# Patient Record
Sex: FEMALE | Race: WHITE
[De-identification: names, ages, dates, MRNs, and addresses within clinical notes are randomized per-mention and may not be internally consistent; named-entity substitution may affect disease eponyms.]

---

## 2018-03-21 ENCOUNTER — HOSPITAL ENCOUNTER (EMERGENCY)
Dept: HOSPITAL 72 - EMR | Age: 73
Discharge: HOME | End: 2018-03-21
Payer: MEDICARE

## 2018-03-21 VITALS — WEIGHT: 164 LBS | BODY MASS INDEX: 29.06 KG/M2 | HEIGHT: 63 IN

## 2018-03-21 VITALS — DIASTOLIC BLOOD PRESSURE: 60 MMHG | SYSTOLIC BLOOD PRESSURE: 143 MMHG

## 2018-03-21 VITALS — SYSTOLIC BLOOD PRESSURE: 143 MMHG | DIASTOLIC BLOOD PRESSURE: 60 MMHG

## 2018-03-21 DIAGNOSIS — N17.9: ICD-10-CM

## 2018-03-21 DIAGNOSIS — E27.8: ICD-10-CM

## 2018-03-21 DIAGNOSIS — N13.2: Primary | ICD-10-CM

## 2018-03-21 DIAGNOSIS — N39.0: ICD-10-CM

## 2018-03-21 DIAGNOSIS — I10: ICD-10-CM

## 2018-03-21 LAB
ADD MANUAL DIFF: NO
ALBUMIN SERPL-MCNC: 3.8 G/DL (ref 3.4–5)
ALBUMIN/GLOB SERPL: 1.3 {RATIO} (ref 1–2.7)
ALP SERPL-CCNC: 97 U/L (ref 46–116)
ALT SERPL-CCNC: 34 U/L (ref 12–78)
ANION GAP SERPL CALC-SCNC: 8 MMOL/L (ref 5–15)
APPEARANCE UR: (no result)
APTT PPP: (no result) S
AST SERPL-CCNC: 27 U/L (ref 15–37)
BILIRUB SERPL-MCNC: 0.5 MG/DL (ref 0.2–1)
BUN SERPL-MCNC: 40 MG/DL (ref 7–18)
CALCIUM SERPL-MCNC: 9.6 MG/DL (ref 8.5–10.1)
CHLORIDE SERPL-SCNC: 103 MMOL/L (ref 98–107)
CO2 SERPL-SCNC: 26 MMOL/L (ref 21–32)
CREAT SERPL-MCNC: 2 MG/DL (ref 0.55–1.3)
ERYTHROCYTE [DISTWIDTH] IN BLOOD BY AUTOMATED COUNT: 12.2 % (ref 11.6–14.8)
GLOBULIN SER-MCNC: 2.9 G/DL
GLUCOSE UR STRIP-MCNC: NEGATIVE MG/DL
HCT VFR BLD CALC: 42 % (ref 37–47)
HGB BLD-MCNC: 14.7 G/DL (ref 12–16)
KETONES UR QL STRIP: (no result)
LEUKOCYTE ESTERASE UR QL STRIP: (no result)
MCV RBC AUTO: 91 FL (ref 80–99)
NITRITE UR QL STRIP: NEGATIVE
PH UR STRIP: 7 [PH] (ref 4.5–8)
PLATELET # BLD: 115 K/UL (ref 150–450)
POTASSIUM SERPL-SCNC: 4.5 MMOL/L (ref 3.5–5.1)
PROT UR QL STRIP: (no result)
RBC # BLD AUTO: 4.61 M/UL (ref 4.2–5.4)
SODIUM SERPL-SCNC: 137 MMOL/L (ref 136–145)
SP GR UR STRIP: 1.01 (ref 1–1.03)
UROBILINOGEN UR-MCNC: 1 MG/DL (ref 0–1)
WBC # BLD AUTO: 10.4 K/UL (ref 4.8–10.8)

## 2018-03-21 PROCEDURE — 83690 ASSAY OF LIPASE: CPT

## 2018-03-21 PROCEDURE — 36415 COLL VENOUS BLD VENIPUNCTURE: CPT

## 2018-03-21 PROCEDURE — 80053 COMPREHEN METABOLIC PANEL: CPT

## 2018-03-21 PROCEDURE — 85025 COMPLETE CBC W/AUTO DIFF WBC: CPT

## 2018-03-21 PROCEDURE — 74176 CT ABD & PELVIS W/O CONTRAST: CPT

## 2018-03-21 PROCEDURE — 81025 URINE PREGNANCY TEST: CPT

## 2018-03-21 PROCEDURE — 96374 THER/PROPH/DIAG INJ IV PUSH: CPT

## 2018-03-21 PROCEDURE — 99284 EMERGENCY DEPT VISIT MOD MDM: CPT

## 2018-03-21 PROCEDURE — 81003 URINALYSIS AUTO W/O SCOPE: CPT

## 2018-03-21 PROCEDURE — 96375 TX/PRO/DX INJ NEW DRUG ADDON: CPT

## 2018-03-21 NOTE — EMERGENCY ROOM REPORT
History of Present Illness


General


Chief Complaint:  Pain


Source:  Patient





Present Illness


HPI


73 yo female patient presents to ER for kidney stones.


Reports CT done five days ago showing kidney stones; does not have copy of 

report; reports 2mm stone found at that time.


Patient complains of worsening of pain since that time.


Reports taking medication at home for pain without relief of symptoms. 


Complains of flank pain and nausea, denies vomiting.


Reports hx of kidney stones twice before int he past. Reports they were "broken 

up" in hospital.


Denies dysuria, hematuria.


Denies fever, chest pain, SOB.


Allergies:  


Coded Allergies:  


     No Known Allergies (Unverified , 3/21/18)





Patient History


Past Medical History:  see triage record


Reviewed Nursing Documentation:  PMH: Agreed; PSxH: Agreed





Nursing Documentation-PMH


Past Medical History:  No History, Except For


Hx Hypertension:  Yes





Review of Systems


All Other Systems:  negative except mentioned in HPI





Physical Exam





Vital Signs








  Date Time  Temp Pulse Resp B/P (MAP) Pulse Ox O2 Delivery O2 Flow Rate FiO2


 


3/21/18 16:41 98.0 65 18 170/65 97 Room Air  





 98.1       








Sp02 EP Interpretation:  reviewed, normal


General Appearance:  well appearing, alert, GCS 15, non-toxic, mild distress


Head:  normocephalic, atraumatic


Eyes:  bilateral eye normal inspection, bilateral eye PERRL


ENT:  hearing grossly normal, normal pharynx, no angioedema, normal voice, 

uvula midline, moist mucus membranes


Neck:  full range of motion


Respiratory:  lungs clear, normal breath sounds, no rhonchi, no respiratory 

distress, no accessory muscle use, no wheezing, speaking full sentences


Cardiovascular #1:  regular rate, rhythm, no edema


Gastrointestinal:  soft, no mass, non-distended, no guarding, no rebound, 

tenderness


Genitourinary:  no CVA tenderness


Musculoskeletal:  back normal, digits/nails normal, gait/station normal, normal 

range of motion, non-tender


Neurologic:  alert, oriented x3, responsive, motor strength/tone normal, 

sensory intact


Psychiatric:  mood/affect normal


Skin:  no rash


Lymphatic:  no adenopathy





Medical Decision Making


PA Attestation


Dr. Herron is my supervising physician with whom patient management has been 

discussed with.


Diagnostic Impression:  


 Primary Impression:  


 Nephrolithiasis


 Additional Impressions:  


 Acute kidney injury


 Urinary tract infection


ER Course


Pt. presents to the ED c/o kidney stones.





Ddx considered but are not limited to UTI, cholelithiasis, cholecystitis, 

pancreatitis, appendicitis, diverticulitis.


Begin abdominal pain workup. Provided patient with pain medication.





Vital signs: are WNL, pt. is afebrile





CURES reports shows no prescriptions filled.





ORDERS:  CBC, CMP, Lipase, UA, Urine pregnancy,  CT abdomen pelvis, Zofran, and 

pain medication.





ER COURSE:


Labs unremarkable, no elevation in WBC, LFTs, or lipase.


BUN and creatinine elevated, consistent with acute kidney injury caused by 

nephrolithiasis.


Urine pregnancy negative


UA equivocal for UTI. Patient reports currently being treated for UTI by 

primary care provider, has 2 days left of ciprofloxacin. Informed patient to 

continue taking medication provided by PCP, will provide single dose of Cipro 

in ER. Will not discharge home with abx.


Patient reports relief of pain symptoms with medication.





0610PM patient resting comfortably, sleeping, in no acute distress.





CT results shows 4mm calculus in kidney. Informed patient stone is non-

obstructive and she is likely to pass it on her own, more likely to cause 

obstruction if >5mm.


Perinephric stranding consistent with UTI. No appendicitis, diverticulitis, or 

SBO.


Discuss results with patient. Copy of results provided to patient





IV fluids provided to patient. Instructed patient to drink fluids to aid in 

passage of kidney stones.





Consult with Dr. Herron, reports agreement with diagnosis, treatment, and 

plan. Patient is stable to discharge home.





Patient reports she is ready for discharge home. Will be driven home by 

. Requesting Norco pain medication. Will provide 5 pills to patient 

until able to followup with primary care provider and discuss referral to  

specialist. Dr. Herron agrees.





DISCHARGE: 


Rx provided for Norco. Take Tylenol for pain following completion of 

medication. Followup with primary care provider for further treatment for pain 

symptoms. Declines Tylenol rx.





At this time pt. is stable for d/c to home. Patient resting comfortably, in no 

acute distress, nontoxic appearing, talking without difficulty, smiling and 

laughing.


Rx provided to patient. Patient to take medications as instructed


Will provide with patient care instructions and any necessary prescriptions.


Care plan and follow-up instructions provided.  Patient instructed to follow-up 

with primary care provider in 3 - 5 days.


Patient questions asked and answered.


Patient reports understanding and agreement to treatment plan.


ER precautions given. Patient instructed to return to ER immediately for any 

new or worsening of symptoms including but not limited to increasing SOB, 

persistent fever, worsening of pain symptoms, intractable vomiting, blood in 

stool, urine, and/or emesis.





Labs








Test


  3/21/18


17:20


 


White Blood Count


  10.4 K/UL


(4.8-10.8)


 


Red Blood Count


  4.61 M/UL


(4.20-5.40)


 


Hemoglobin


  14.7 G/DL


(12.0-16.0)


 


Hematocrit


  42.0 %


(37.0-47.0)


 


Mean Corpuscular Volume 91 FL (80-99) 


 


Mean Corpuscular Hemoglobin


  31.8 PG


(27.0-31.0)


 


Mean Corpuscular Hemoglobin


Concent 34.9 G/DL


(32.0-36.0)


 


Red Cell Distribution Width


  12.2 %


(11.6-14.8)


 


Platelet Count


  115 K/UL


(150-450)


 


Mean Platelet Volume


  9.5 FL


(6.5-10.1)


 


Neutrophils (%) (Auto)  % (45.0-75.0) 


 


Lymphocytes (%) (Auto)  % (20.0-45.0) 


 


Monocytes (%) (Auto)  % (1.0-10.0) 


 


Eosinophils (%) (Auto)  % (0.0-3.0) 


 


Basophils (%) (Auto)  % (0.0-2.0) 


 


Urine Color Pale yellow 


 


Urine Appearance


  Slightly


cloudy


 


Urine pH 7 (4.5-8.0) 


 


Urine Specific Gravity


  1.010


(1.005-1.035)


 


Urine Protein 2+ (NEGATIVE) 


 


Urine Glucose (UA)


  Negative


(NEGATIVE)


 


Urine Ketones 1+ (NEGATIVE) 


 


Urine Occult Blood 5+ (NEGATIVE) 


 


Urine Nitrite


  Negative


(NEGATIVE)


 


Urine Bilirubin


  Negative


(NEGATIVE)


 


Urine Urobilinogen


  1 MG/DL


(0.0-1.0)


 


Urine Leukocyte Esterase 2+ (NEGATIVE) 


 


Urine RBC


  20-30 /HPF (0


- 2)


 


Urine WBC


  2-4 /HPF (0 -


2)


 


Urine Squamous Epithelial


Cells Few /LPF


(NONE/OCC)


 


Urine Bacteria


  Few /HPF


(NONE)


 


Urine HCG, Qualitative


  Negative


(NEGATIVE)


 


Sodium Level


  137 MMOL/L


(136-145)


 


Potassium Level


  4.5 MMOL/L


(3.5-5.1)


 


Chloride Level


  103 MMOL/L


()


 


Carbon Dioxide Level


  26 MMOL/L


(21-32)


 


Anion Gap


  8 mmol/L


(5-15)


 


Blood Urea Nitrogen


  40 mg/dL


(7-18)


 


Creatinine


  2.0 MG/DL


(0.55-1.30)


 


Estimat Glomerular Filtration


Rate 24.6 mL/min


(>60)


 


Glucose Level


  121 MG/DL


()


 


Calcium Level


  9.6 MG/DL


(8.5-10.1)


 


Total Bilirubin


  0.5 MG/DL


(0.2-1.0)


 


Aspartate Amino Transf


(AST/SGOT) 27 U/L (15-37) 


 


 


Alanine Aminotransferase


(ALT/SGPT) 34 U/L (12-78) 


 


 


Alkaline Phosphatase


  97 U/L


()


 


Total Protein


  6.7 G/DL


(6.4-8.2)


 


Albumin


  3.8 G/DL


(3.4-5.0)


 


Globulin 2.9 g/dL 


 


Albumin/Globulin Ratio 1.3 (1.0-2.7) 


 


Lipase


  161 U/L


()








CT/MRI/US Diagnostic Results


CT/MRI/US Diagnostic Results :  


   Imaging Test Ordered:  CT Abdomen pelvis


   Impression


Left proximal ureter 4 mm calculus with associated hydronephrosis.


Left perinephric stranding, cannot exclude infection/inflammation.


Right nephrolithiasis.


Bilateral adrenal thickening, followup per final report.


No appendicitis, SBO, or diverticulitis. Moderate colonic stool.





Last Vital Signs








  Date Time  Temp Pulse Resp B/P (MAP) Pulse Ox O2 Delivery O2 Flow Rate FiO2


 


3/21/18 16:41 98.0 65 18 170/65 97 Room Air  





 98.1       








Disposition:  HOME, SELF-CARE


Condition:  Stable


Scripts


Hydrocodone Bit/Acetaminophen 5-325* (NORCO 5-325*) 1 Each Tablet


1 TAB ORAL Q6H PRN for For Pain, #5 TAB 0 Refills


   Prov: Augustus York         3/21/18


Patient Instructions:  Acute Kidney Injury, Kidney Stones, Easy-to-Read, 

Urinary Tract Infection, Easy-to-Read





Additional Instructions:  


Followup with primary care provider in 3 -5 days.


Continue to take Cipro for UTI.


Take medications as directed. 


Patient questions asked and answered.


ER precautions given, patient instructed to return to ER immediately for any 

new or worsening of symptoms.











Augustus York Mar 21, 2018 17:17

## 2018-03-22 NOTE — DIAGNOSTIC IMAGING REPORT
Indication: Abdominal pain

 

Technique: Spiral acquisitions obtained through the abdomen and pelvis. No oral

contrast utilized, per emergency room physician request No IV contrast utilized,  per

referring physician request.. Multiplanar reconstructions were generated. Total dose

length product 802.63 mGycm. CTDIvol(s) 16.38 mGy. Dose reduction achieved using

automated exposure control

 

 

Comparison: None

 

Findings: There is a 6 x 4 mm calculus in the proximal left ureter just beyond the

ureteropelvic junction. There is results are mild to moderate left hydronephrosis and

considerable perinephric fat stranding, as well as enlargement of the left kidney. No

intrarenal calculi are demonstrated. Lack of IV contrast limits assessment of the

renal parenchyma. There is a 1.5 cm cyst in the upper pole of the left kidney.

 

The right kidney is markedly atrophic. It demonstrates multiple cysts. It also

demonstrates a calculus in the lower pole collecting system which is nonobstructive.

No right hydronephrosis or right ureteral calculi demonstrated.

 

Lack of IV contrast limits assessment of the other solid organs. The gallbladder is

surgically absent. The bile ducts are unremarkable. The liver, pancreas, spleen are

unremarkable. The adrenals are bulky bilaterally. No retroperitoneal or mesenteric

mass or adenopathy. A densely calcified 3 cm mass is seen deep in the posterior

pelvis. No other pelvic mass or adenopathy. Uterus contains a few calcifications.

 

The appendix is normal. No evidence of diverticulosis or diverticulitis. No small

bowel distention. No free or loculated intraperitoneal air or fluid is evident.

Distal esophagus, stomach, duodenum are unremarkable.

 

There are extensive degenerative changes of the thoracic and lumbar spine. The

included lung bases are clear.

 

Impression: Positive for obstructive 6 x 4 mm proximal left ureteral calculus. This

results in mild-to-moderate left hydronephrosis and considerable perinephric fat

stranding

 

Right nephrolithiasis, nonobstructive. Atrophic right kidney with cysts.

 

Bilateral adrenal hypertrophy

 

3 cm calcification in the posterior pelvis, probably postinflammatory

 

Degenerative spondylosis

 

This agrees with the preliminary interpretation provided overnight by [facility]

teleradiology service.

 

The CT scanner at [facility] is accredited by the American College of

Radiology and the scans are performed using protocols designed to limit radiation

exposure to as low as reasonably achievable to attain images of sufficient resolution

adequate for diagnostic evaluation.

## 2021-03-12 ENCOUNTER — HOSPITAL ENCOUNTER (OUTPATIENT)
Dept: HOSPITAL 72 - SUR | Age: 76
Discharge: HOME | End: 2021-03-12
Payer: MEDICARE

## 2021-03-12 VITALS — DIASTOLIC BLOOD PRESSURE: 55 MMHG | SYSTOLIC BLOOD PRESSURE: 124 MMHG

## 2021-03-12 VITALS — SYSTOLIC BLOOD PRESSURE: 124 MMHG | DIASTOLIC BLOOD PRESSURE: 59 MMHG

## 2021-03-12 VITALS — DIASTOLIC BLOOD PRESSURE: 78 MMHG | SYSTOLIC BLOOD PRESSURE: 136 MMHG

## 2021-03-12 VITALS — DIASTOLIC BLOOD PRESSURE: 60 MMHG | SYSTOLIC BLOOD PRESSURE: 129 MMHG

## 2021-03-12 VITALS — SYSTOLIC BLOOD PRESSURE: 126 MMHG | DIASTOLIC BLOOD PRESSURE: 60 MMHG

## 2021-03-12 VITALS — DIASTOLIC BLOOD PRESSURE: 62 MMHG | SYSTOLIC BLOOD PRESSURE: 144 MMHG

## 2021-03-12 VITALS — SYSTOLIC BLOOD PRESSURE: 145 MMHG | DIASTOLIC BLOOD PRESSURE: 65 MMHG

## 2021-03-12 VITALS — DIASTOLIC BLOOD PRESSURE: 57 MMHG | SYSTOLIC BLOOD PRESSURE: 139 MMHG

## 2021-03-12 VITALS — DIASTOLIC BLOOD PRESSURE: 59 MMHG | SYSTOLIC BLOOD PRESSURE: 121 MMHG

## 2021-03-12 VITALS — WEIGHT: 156 LBS | HEIGHT: 64 IN | BODY MASS INDEX: 26.63 KG/M2

## 2021-03-12 VITALS — SYSTOLIC BLOOD PRESSURE: 135 MMHG | DIASTOLIC BLOOD PRESSURE: 60 MMHG

## 2021-03-12 DIAGNOSIS — M81.0: ICD-10-CM

## 2021-03-12 DIAGNOSIS — M21.611: ICD-10-CM

## 2021-03-12 DIAGNOSIS — Z79.899: ICD-10-CM

## 2021-03-12 DIAGNOSIS — E03.9: ICD-10-CM

## 2021-03-12 DIAGNOSIS — M21.612: ICD-10-CM

## 2021-03-12 DIAGNOSIS — I10: ICD-10-CM

## 2021-03-12 DIAGNOSIS — M20.42: ICD-10-CM

## 2021-03-12 DIAGNOSIS — M20.41: Primary | ICD-10-CM

## 2021-03-12 DIAGNOSIS — D64.9: ICD-10-CM

## 2021-03-12 DIAGNOSIS — Z79.82: ICD-10-CM

## 2021-03-12 DIAGNOSIS — L57.0: ICD-10-CM

## 2021-03-12 DIAGNOSIS — K21.9: ICD-10-CM

## 2021-03-12 DIAGNOSIS — M19.90: ICD-10-CM

## 2021-03-12 PROCEDURE — 73630 X-RAY EXAM OF FOOT: CPT

## 2021-03-12 PROCEDURE — 94150 VITAL CAPACITY TEST: CPT

## 2021-03-12 PROCEDURE — 28285 REPAIR OF HAMMERTOE: CPT

## 2021-03-12 PROCEDURE — 94003 VENT MGMT INPAT SUBQ DAY: CPT

## 2021-03-12 NOTE — BRIEF OPERATIVE NOTE
Immediate Post Operative Note


Operative Note


Pre-op Diagnosis:


Hammertoe deformity fourth right digit


Procedure:


Hammertoe correction fourth right digit


Post-op Diagnosis:  same as pre-op


Surgeon:  Real


Anesthesiologist:  Susan


Anesthesia:  MAC


Specimen:  yes


Complications:  none


Condition:  stable


Fluids:  500


Estimated Blood Loss:  none


Drains:  none


Implant(s) used?:  No











Mayo Noble DPM                 Mar 12, 2021 08:18

## 2021-03-12 NOTE — DIAGNOSTIC IMAGING REPORT
EXAM: X-RAY XRAY Foot Complete R

 

CLINICAL HISTORY: Foot pain. 

 

COMPARISON:  None

 

FINDINGS:  Total of 3 views of the right foot were obtained. 

 

There is mild diffuse osteopenia. The toes are held in slight extension. No acute

bony abnormality noted. There is no fracture, bony lesions or erosions. There is a

small calcaneal spur. Joint spaces appear anatomic. Surrounding soft tissue is

normal.

 

IMPRESSION:

 

NO ACUTE BONY ABNORMALITY.

## 2021-03-12 NOTE — PRE-PROCEDURE NOTE/ATTESTATION
Pre-Procedure Note/Attestation


Complete Prior to Procedure


Planned Procedure:  right


Procedure Narrative:


Hammertoe correction fourth right digit





Indications for Procedure


Pre-Operative Diagnosis:


Hammertoe deformity fourth right digit





Attestation


I attest that I discussed the nature of the procedure; its benefits; risks and 

complications; and alternatives (and the risks and benefits of such 

alternatives), prior to the procedure, with the patient (or the patient's legal 

representative).





I attest that, if there was a reasonable possibility of needing a blood 

transfusion, the patient (or the patient's legal representative) was given the 

[facility] standardized written summary, pursuant 

to the Rui [place] Blood Safety Act ([place] Health and Safety Code # 1645, as 

amended).





I attest that I re-evaluated the patient just prior to the surgery and that 

there has been no change in the patient's H&P, except as documented below:











Mayo Noble DPM                 Mar 12, 2021 07:42

## 2021-03-12 NOTE — IMMEDIATE POST-OP EVALUATION
Immediate Post-Op Evalulation


Immediate Post-Op Evalulation


Procedure:  R 4-th hammertoe correction


Date of Evaluation:  Mar 12, 2021


Time of Evaluation:  08:25


IV Fluids:  500


Blood Products:  none


Estimated Blood Loss:  min


Urinary Output:  none


Blood Pressure Systolic:  142


Blood Pressure Diastolic:  58


Pulse Rate:  54


Respiratory Rate:  18


O2 Sat by Pulse Oximetry:  99


Temperature (Fahrenheit):  97.6


Pain Score (1-10):  1


Nausea:  No


Vomiting:  No


Complications


none


Patient Status:  awake, patent, none


Hydration Status:  adequate











Sae Davis MD            Mar 12, 2021 08:26

## 2021-03-12 NOTE — HISTORY AND PHYSICAL REPORT
DATE OF ADMISSION:  03/12/2021

PODIATRIC HISTORY AND PHYSICAL



HISTORY OF PRESENT ILLNESS:  This is a 75-year-old white female that is

admitted today for an outpatient correction of her fourth right hammertoe

deformity.  The patient has been under my care for the past 3 years.

She had developed a painful corn over the dorsal aspect of her fourth

right digit and numerous debridement of the skin lesion along with

shoe-gear modification and padding failed to alleviate her symptoms.  The

patient was consulted on surgical correction.



PAST MEDICAL HISTORY:  Remarkable for hypertension, kidney stones,

osteoporosis, DJD, GERD, and back pain.  She has a history of spinal

stenosis post laminectomy.



MEDICATIONS:  Norvasc, Singulair, Celebrex, aspirin, Crestor, Lyrica,

Lidoderm, Hyzaar, Tradjenta, and rosuvastatin.



ALLERGIES:  No known drug allergies.



PODIATRIC PHYSICAL EXAMINATION:

VASCULAR STATUS:  The dorsalis pedis and posterior tibial arteries are

equally palpable measuring 2/4 bilaterally.  Capillary filling time is

less than 4 seconds to all digits bilaterally.  Homans sign is negative.

Mild varicosities are noted bilateral lower extremity.

NEUROLOGICAL:  Reveals intact reflexes Achilles and patellar measuring 2/4

bilaterally.  Sensation, proprioception and vibration are all intact

bilateral lower extremity.  Babinski is negative.  Clonus is absent.

MUSCULOSKELETAL:  Reveals nonreducible hammertoe deformities of digits 2

through 5 bilaterally.  Mild bunion deformities are noted bilaterally.

Decreased range of motion of the forefoot and midfoot is noted

bilaterally.  No crepitation is present.

DERMATOLOGICAL:  Reveals a keratoma overlying the dorsal aspect of the

fourth right proximal interphalangeal joint.  No other skin lesions, scars

or ulcerations are present bilaterally.  All nails are present and healthy

bilaterally.



ASSESSMENT:  Hammertoe deformity, fourth right digit with painful

keratoma.



PLAN:  The patient is admitted today for an outpatient correction of her

fourth right hammertoe deformity.  Risks, complications and alternatives

were discussed with the patient.  Postoperative instructions were given to

the patient.  Postoperative medication was dispensed to the patient.  The

patient elected to proceed with surgery.









  ______________________________________________

  Mayo Noble D.P.M. DR:  AVIVA/ANA MARÍA

D:  03/12/2021 10:15

T:  03/12/2021 12:16

JOB#:  [id number]

CC:



CELESTE

## 2021-03-12 NOTE — OPERATIVE NOTE - DICTATED
DATE OF OPERATION:  03/12/2021

SURGEON:  Mayo Noble DPM.



ANESTHESIOLOGIST:  Sae Davis MD.



ANESTHESIA:  Local standby.



PREOPERATIVE DIAGNOSIS:  Hammertoe deformity, fourth right digit.



POSTOPERATIVE DIAGNOSIS:  Hammertoe deformity, fourth right digit.



PROCEDURE PERFORMED:  Arthroplasty, fourth right proximal interphalangeal

joint.



DESCRIPTION OF THE OPERATION:  The patient was brought to the operating

room and was placed on the operating room table in the supine position.

IV sedation was administered by the anesthesiologist, local anesthesia

consisting of 0.5% Marcaine plain total of 7 mL was administered to the

fourth right digit.  An ankle tourniquet was applied to the right lower

extremity.  The foot was prepped and draped in the usual sterile manner.

An Esmarch bandage was utilized to exsanguinate the blood and the right

ankle tourniquet was inflated to 250 mmHg.  Attention was then directed to

the fourth right digit where an approximately 3 to 4 cm skin incision was

centered over the proximal interphalangeal joint.  The incision was

deepened utilizing sharp and blunt dissection with care being taken to

cauterize and ligate all bleeders.  At the level of the joint, a

transverse tenotomy was performed.  The extensor tendon was reflected

proximally and the head of the proximal phalanx was exposed.  The

collateral ligaments were severed.  Utilizing a sagittal saw, the head of

the proximal phalanx was resected in toto.  The remaining bone was rasped

smooth.  The wound was copiously flushed utilizing sterile saline.  The

extensor tendon was then reapproximated utilizing 4-0 Vicryl in a simple

interrupted type stitch.  The skin was then reapproximated utilizing 4-0

nylon in a simple interrupted type stitch.  The wound was dressed

utilizing an Adaptic 4 x 4 gauze and 3 inch Tanja.  The right ankle

tourniquet was deflated and vascular supply was noted to all digits right

foot.









  ______________________________________________

  Mayo Noble D.P.M. DR:  JUAN DIEGO

D:  03/12/2021 09:50

T:  03/12/2021 11:49

JOB#:  [id number]

CC:

## 2021-03-12 NOTE — 48 HOUR POST ANESTHESIA EVAL
Post Anesthesia Evaluation


Procedure:  R 4-th hammertoe correction


Date of Evaluation:  Mar 12, 2021


Time of Evaluation:  09:20


Blood Pressure Systolic:  136


0:  78


Pulse Rate:  52


Respiratory Rate:  20


Temperature (Fahrenheit):  97.6


O2 Sat by Pulse Oximetry:  98


Airway:  patent


Nausea:  No


Vomiting:  No


Pain Intensity:  1


Hydration Status:  adequate


Cardiopulmonary Status:


stable


Mental Status/LOC:  patient returned to baseline


Follow-up Care/Observations:


n/a


Post-Anesthesia Complications:


none


Follow-up care needed:  ready to discharge











Sae Davis MD            Mar 12, 2021 09:21

## 2021-03-12 NOTE — ANETHESIA PREOPERATIVE EVAL
Anesthesia Pre-op PMH/ROS


General


Date of Evaluation:  Mar 12, 2021


Time of Evaluation:  07:21


Anesthesiologist:  Susan


ASA Score:  ASA 2


Mallampati Score


Class I : Soft palate, uvula, fauces, pillars visible


Class II: Soft palate, uvula, fauces visible


Class III: Soft palate, base of uvula visible


Class IV: Only hard plate visible


Mallampati Classification:  Class II


Surgeon:  Real


Diagnosis:  R foot hamertoe


Surgical Procedure:  Hamertoe correction


Anesthesia History:  none


Family History:  no anesthesia problems


Allergies:  


Coded Allergies:  


     No Known Allergies (Unverified , 3/12/21)


Medications:  see eMAR


Patient NPO?:  Yes





Past Medical History


Cardiovascular:  Reports: HTN; 


   Denies: CAD, MI, valve dz, arrhythmia, other


Pulmonary:  Denies: asthma, COPD, [place], other


Gastrointestinal/Genitourinary:  Reports: GERD; 


   Denies: CRI, ESRD, other


Neurologic/Psychiatric:  Reports: depression/anxiety; 


   Denies: dementia, CVA, TIA, other


Endocrine:  Reports: hypothyroidism; 


   Denies: DM, steroids, other


HEENT:  Reports: cataract (L) - s/p x; 


   Denies: cataract (R), glaucoma, [place] (L), [place] (R), other


Hematology/Immune:  Reports: anemia - mild; 


   Denies: DVT, bleeding disorder, other


Musculoskeletal/Integumentary:  Reports: OA; 


   Denies: RA, DJD, DDD, edema, other


PMH Narrative:


 above


PSxH Narrative:


se H&P





Anesthesia Pre-op Phys. Exam


Physician Exam





Last Vital Signs








  Date Time  Temp Pulse Resp B/P (MAP) Pulse Ox O2 Delivery O2 Flow Rate FiO2


 


3/12/21 06:32      Room Air  


 


3/12/21 06:31 97.0 59 18 145/65 100   








Constitutional:  NAD


Neurologic:  CN 2-12 intact


Cardiovascular:  RRR, no M/R/G


Respiratory:  CTA


Gastrointestinal:  S/NT/ND





Airway Exam


Mallampati Score:  Class II


MO:  limited


Neck:  stiff


ROM:  limited


Teeth:  missing


Dentures:  no upper, no lower





Anesthesia Pre-op A/P


Labs


see chart





Studies


Pre-op Studies:  EKG - S





Risk Assessment & Plan


Assessment:


ASA 2


Plan:


MAC


Status Change Before Surgery:  No





Pre-Antibiotics


Drug:  Ancef 1gr


Given Within 1 Hr of Incision:  Yes


Time Given:  08:15











Sae Davis MD            Mar 12, 2021 07:24